# Patient Record
Sex: FEMALE | Race: WHITE
[De-identification: names, ages, dates, MRNs, and addresses within clinical notes are randomized per-mention and may not be internally consistent; named-entity substitution may affect disease eponyms.]

---

## 2021-01-25 ENCOUNTER — HOSPITAL ENCOUNTER (INPATIENT)
Dept: HOSPITAL 46 - MS | Age: 56
LOS: 7 days | Discharge: HOME | DRG: 734 | End: 2021-02-01
Attending: SURGERY | Admitting: SURGERY
Payer: COMMERCIAL

## 2021-01-25 VITALS — HEIGHT: 67 IN | BODY MASS INDEX: 29 KG/M2 | WEIGHT: 184.75 LBS

## 2021-01-25 DIAGNOSIS — C56.2: Primary | ICD-10-CM

## 2021-01-25 DIAGNOSIS — G89.18: ICD-10-CM

## 2021-01-25 DIAGNOSIS — R18.0: ICD-10-CM

## 2021-01-25 DIAGNOSIS — Z88.8: ICD-10-CM

## 2021-01-25 DIAGNOSIS — Z20.822: ICD-10-CM

## 2021-01-25 DIAGNOSIS — N93.8: ICD-10-CM

## 2021-01-25 DIAGNOSIS — Z87.891: ICD-10-CM

## 2021-01-25 PROCEDURE — C9803 HOPD COVID-19 SPEC COLLECT: HCPCS

## 2021-01-25 PROCEDURE — 0UT70ZZ RESECTION OF BILATERAL FALLOPIAN TUBES, OPEN APPROACH: ICD-10-PCS | Performed by: SURGERY

## 2021-01-25 PROCEDURE — 0DBU0ZZ EXCISION OF OMENTUM, OPEN APPROACH: ICD-10-PCS | Performed by: SURGERY

## 2021-01-25 PROCEDURE — A9270 NON-COVERED ITEM OR SERVICE: HCPCS

## 2021-01-25 PROCEDURE — U0003 INFECTIOUS AGENT DETECTION BY NUCLEIC ACID (DNA OR RNA); SEVERE ACUTE RESPIRATORY SYNDROME CORONAVIRUS 2 (SARS-COV-2) (CORONAVIRUS DISEASE [COVID-19]), AMPLIFIED PROBE TECHNIQUE, MAKING USE OF HIGH THROUGHPUT TECHNOLOGIES AS DESCRIBED BY CMS-2020-01-R: HCPCS

## 2021-01-25 PROCEDURE — 0DTJ0ZZ RESECTION OF APPENDIX, OPEN APPROACH: ICD-10-PCS | Performed by: SURGERY

## 2021-01-25 PROCEDURE — 0BBT0ZX EXCISION OF DIAPHRAGM, OPEN APPROACH, DIAGNOSTIC: ICD-10-PCS | Performed by: SURGERY

## 2021-01-25 PROCEDURE — 0UT90ZZ RESECTION OF UTERUS, OPEN APPROACH: ICD-10-PCS | Performed by: SURGERY

## 2021-01-25 PROCEDURE — 0UT20ZZ RESECTION OF BILATERAL OVARIES, OPEN APPROACH: ICD-10-PCS | Performed by: SURGERY

## 2021-01-25 PROCEDURE — 07TC0ZZ RESECTION OF PELVIS LYMPHATIC, OPEN APPROACH: ICD-10-PCS | Performed by: SURGERY

## 2021-01-25 PROCEDURE — 0UBF0ZX EXCISION OF CUL-DE-SAC, OPEN APPROACH, DIAGNOSTIC: ICD-10-PCS | Performed by: SURGERY

## 2021-01-25 NOTE — DS
[facility]
                                    [address], [place]  [address]
_________________________________________________________________________________________
                                                                 Draft    
 
 
ADMISSION DATE:  01/25/2021
 
DISCHARGE DATE:  02/01/2021
 
REASON FOR ADMISSION:
This 55-year-old white woman __________ transferred from [facility] with
Dr. Nina Chung with a large pelvic mass and ascites. 
 
The patient presented to the emergency room in [place] with abdominal pain and fullness
and a CT scan was performed by Dr. Chung confirming a multiloculated pelvic mass
suggestive of right ovary with ascites.  She has no prior history of ovarian problem,
specifically no ovarian torsion or ovarian cyst.  She has no family history of ovarian
cancer or uterine cancer. 
 
The patient does have dysfunctional menstrual bleeding for quite some time.  She has a
distant history of tubal ligation as well.  She was admitted for further evaluation and
care. 
 
PERTINENT PHYSICAL EXAMINATION:
GENERAL:  Showed a pleasant white woman, who did not look systemically toxic. 
HEENT:  Mucous membranes are reasonably moist.  Trachea midline. 
CHEST:  Clear. 
HEART:  Regular without murmur. 
ABDOMEN:  Distended and firm, consistent with ascites.  She did not have focal
tenderness or diffuse peritonitis, but was mildly tender throughout. 
 
LABORATORY STUDIES:
Showed a white count of 12.4, hematocrit 47, platelets 340,000.  Chem profile normal and
creatinine 0.9. 
 
HOSPITAL COURSE:
She was admitted with a strong suspicion of ovarian carcinoma, though other etiologies
including ovarian torsion or other problem were considered of course.  A CA-125 serum
tumor marker was obtained, which was elevated at 74.9.  Her diffuse abdominal tenderness
and bloating were significant and did improve with IV fluid administration likely
related to fluid losses and ultimately, she did undergo paracentesis for symptom control
and assessment of the peritoneal fluid. 
 
Subsequent evaluation showed no sign of malignant cells within the peritoneal fluid. 
 
Given her large mass of the pelvis, which is essentially consumed all of the pelvis and
her ascites, which at the time was of uncertain malignancy.  Recommendation was made for
 
                                                                                    
_________________________________________________________________________________________
PATIENT NAME:     ANALIA AMIN                   
MEDICAL RECORD #: [id number]            DISCHARGE SUMMARY             
          ACCT #: [id number]  
DATE OF BIRTH:   [birth year]            REPORT #: [id number]      
PHYSICIAN:        ALISON JEFFRIES MD                 
PCP:              NINA CHUNG MD           
REPORT IS CONFIDENTIAL AND NOT TO BE RELEASED WITHOUT AUTHORIZATION
 
 
                                  [facility]
                                    [address], [place]  [address]
_________________________________________________________________________________________
                                                                 Draft    
 
 
excision of the mass, probable hysterectomy, omentectomy, retroperitoneal lymph node
dissection, and other indicated procedures for a presumptive diagnosis of ovarian
carcinoma. 
 
On January 29, 2021, she underwent exploration of the abdomen, excision of what turned
out to be a left giant pelvic mass (ovarian mass) with total abdominal hysterectomy,
bilateral salpingo-oophorectomy, omentectomy, cul-de-sac biopsy, collection of
peritoneal fluid for cytology, brush biopsy of right hemidiaphragm and retroperitoneal
lymph node dissection, mostly in the aortocaval area and infracaval area centrally.
Assistant was Dr. Rodriguez. 
 
Frozen pathology at time of operation confirmed carcinoma of the ovary, possibly
mucinous type.  Appendectomy was performed as well given that finding. 
 
Postoperatively, she had a considerable amount of incisional pain despite a non-opiod
approach to her pain management including TAP blocks and other interventions.  On that
basis, she was given morphine PCA, which did control her pain quite well. 
 
She had progressive improvement, was begun on clear liquids on 1st postoperative day,
which she tolerated well.  Progressive intake of an advancement of her diet was noted
and by day of discharge, she is ambulating well, passing flatus without problem, has
incisional pain, well controlled with both opioid and non-opioid medications and is
doing well.  The paracentesis cytology showed no evidence of malignant cells, which was
somewhat surprising, but we await final pathology of other fluid obtained at time of
operation as well. 
 
FOLLOWUP PLAN:
She is return to see me in approximately 3-4 weeks.  She will likely be a candidate for
adjuvant chemotherapy depending on the pathologic findings ultimately.  She is advised
to lift no more than 20 pounds for the next 4 weeks.  She is encouraged to ambulate
daily.  She will leave [facility] on. 
 
DISCHARGE DIAGNOSES:
1. 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
ascites. 
2. Status post excision of giant left pelvic mass with total abdominal hysterectomy,
bilateral salpingo-oophorectomy, omentectomy, appendectomy, cul-de-sac biopsy,
collection of peritoneal fluid for cytology and brush biopsy of right hemidiaphragm and
retroperitoneal lymph node dissection. 
3. Distant history of tubal ligation.
4. Dysfunctional uterine bleeding.
 
 
                                                                                    
_________________________________________________________________________________________
PATIENT NAME:     ANALIA AMIN                   
MEDICAL RECORD #: [id number]            DISCHARGE SUMMARY             
          ACCT #: [id number]  
DATE OF BIRTH:   [birth year]            REPORT #: [id number]      
PHYSICIAN:        ALISON JEFFRIES MD                 
PCP:              NINA CHUNG MD           
REPORT IS CONFIDENTIAL AND NOT TO BE RELEASED WITHOUT AUTHORIZATION
 
 
                                  [facility]
                                    [address] [place] [address]  [address]
_________________________________________________________________________________________
                                                                 Draft    
 
 
 
 
            ________________________________________
            MD SHADE Desai/MODL
Job #:  [id number]
DD:  02/01/2021 09:32:48
DT:  02/01/2021 10:05:02
 
cc:            Nina Chung MD
 
 
Copies:  NINA CHUNG MD
~
 
 
 
 
 
 
 
 
 
 
 
 
 
 
 
 
 
 
 
 
 
 
 
 
 
 
 
                                                                                    
_________________________________________________________________________________________
PATIENT NAME:     ANALIA AMIN                   
MEDICAL RECORD #: [id number]            DISCHARGE SUMMARY             
          ACCT #: [id number]  
DATE OF BIRTH:   [birth year]            REPORT #: [id number]      
PHYSICIAN:        ALISON JEFFRIES MD                 
PCP:              NINA CHUNG MD           
REPORT IS CONFIDENTIAL AND NOT TO BE RELEASED WITHOUT AUTHORIZATION

## 2021-01-25 NOTE — NUR
EVENING ASSESSMENT COMPLETE. SCHEDULED MEDS ADMINISTERED. PRN ADMINISTERED FOR
5/10 ABD PAIN. PT REPORTS FEELING BLOATED. ABD FIRM AND DISTENDED. BOWEL TONES
ACTIVE. DENIES NAUSEA. FULL LIQUIDS PROVIDED. IVF INFUSING PER ORDER. WARM
BLANKET PROVIDED. PT DENIES QUESTIONS OR CONCERNS. CALL LIGHT IN REACH.

## 2021-01-25 NOTE — NUR
PT AMBULATES TO MED SURG. ORIENTED TO ROOM CALL LIGHT IN HAND. BOYFRIEND
PRESENT IN THE ROOM. PT VERBALIZES UNDERSTANDING NPO.

## 2021-01-25 NOTE — NUR
DR JEFFRIES IN TO SEE PT DISCUSSES DIAGNOSTIC PLAN GOING FORWARD ALL QUESTIONS
ANSWERED,  PRESENT IN THE ROOM. PT OKAY'D FOR CLEAR LIQUIDS, BROTH AND
JELLO PROVIDED. PAIN 4/10 WAITING FOR MD ORDERS

## 2021-01-25 NOTE — NUR
REPORT RECEIVED FROM DAY SHIFT RN. PT LYING IN BED ALERT AND ORIENTED EATING
JELLO. IVF INFUSING. PT DENIES NEEDS AT THIS TIME. WHITE BOARD UPDATED. CALL
LIGHT IN REACH.

## 2021-01-26 PROCEDURE — 0W9G3ZZ DRAINAGE OF PERITONEAL CAVITY, PERCUTANEOUS APPROACH: ICD-10-PCS | Performed by: SURGERY

## 2021-01-26 NOTE — NUR
BEDSIDE REPORT RECEIVED FROM RADHA ALEMAN. pt RESTING IN BED. DENIES ANY PAIN. NO
REQUESTS AT THIS TIME. IV NOTED TO BE LEAKING, FLUIDS DISCONNECTED. CALL LIGHT
IN REACH.

## 2021-01-26 NOTE — NUR
[facility] NURSING STUDENT JESSIE
IN PTS ROOM TO CHECK ON PT. PT STATES THAT HER PAIN IS
TOLERABLE AT THIS TIME. JESSIE ALSO DICUSSED WITH PT THAT  WILL BE IN
AFTER A BIT.

## 2021-01-26 NOTE — NUR
PT RESTING IN BED WITH . PT SAID SHE FEELS MUCH BETTER, REQUESTED TO
HAVE  VISIT TODAY. WILL INFORM FR GOYAL. GAVE BLESSING, WILL FOLLOW

## 2021-01-26 NOTE — NUR
this rn in pts room with [facility] nursing student mo. pt states that she is
feeling bloated in her abdomen at this time. pt states that her pain is well
managed at this time. this rn educated pt on the paracentesis produre, pt
states understanding and all questions answered at this time.

## 2021-01-26 NOTE — NUR
this rn in pts room with  to assist with pt having a paracentesis. pt
tolerated well.  able to get 2.5l off of pts abdomen.

## 2021-01-26 NOTE — NUR
Pt. got up and took a shower independantly. CNA wrapped IV site. Bed linens
changed, room picked up. no other needs at this time. call light with in
reach.

## 2021-01-26 NOTE — NUR
VS AND I&O COMPLETE. PT UP TO BR WITH MINIMAL ASSIST TO VOID 700 ML
CONCENTRATED URINE. GAIT STEADY. BACK TO BED, JACK WELL. REPORTS PAIN IS
TOLERABLE AND DENIES PRN FOR PAIN AT THIS TIME. PUDDING PROVIDED PER REQUEST.
 IN ROOM. NO FURTHER NEEDS.

## 2021-01-26 NOTE — NUR
PATIENT PROVIDED WITH BROTH AND PUDDING. VITALS AND I&O COMPLETED. GUEST IN
ROOM AND WAS ASKED IF HE NEEDED ANYTHING. GUEST DENIES ANY NEEDS. PATIENT WAS
ASKED IF SHE WANTED TO GET GET UP TO USE THE RESTROOM. PATIENT DENIED NEEDING
TO USE THE RESTROOM TO VOID. RADHA ALFARO IN ROOM.

## 2021-01-26 NOTE — NUR
Pt and spouse resting in bed.  State they live in [place] in a 2 story
home and only use lower floor.  Multiple steps down to get to house
but have new steps with good hand rails.  They have 5 adult children
grandchildren.  She works as a caregiver for 2 patients.  She is worried
about her diagnosis, but spouse is very supportive.  He is retired and
will stay home with pt, complete house hold chores, shop, and transport
as needed if pt has surgery.  Ultimate plan is for pt to dc to home when
she can be discharged.

## 2021-01-26 NOTE — NUR
VS AND I&O COMPLETE. PT DENIES NAUSEA. PRN ADMINISTERED FOR C/O BACK AND ABD
PAIN. BROTH AND PUDDING PROVIDED. ASSESSMENT COMPLETE. ABD REMAINS DISTENDED
AND FIRM. BOWEL TONES ACTIVE. SPOUSE IN ROOM. NO FURTHER NEEDS AT THIS TIME.
CALL LIGHT IN REACH.

## 2021-01-26 NOTE — NUR
this rn received report from babs ferraro. pt sitting up in bed with pts 
next to her. pts  a bit tearful this am. pt states that her pain level
is good and is planning to go for a walk in a bit

## 2021-01-26 NOTE — NUR
NEW IV STARTED LEFT FOREARM WNL, pt TOLERATED WELL. UP TO RESTROOM
INDEPENDENTLY FOR VOID AND BACK TO BED. IVF INFUSING WNL AS ORDERED. pt DENIES
PAIN AT THIS TIME. ABD DISTENDED, SOFT, BOWEL TONES ACTIVE X 4. DENIES PAIN
WITH PALPATION, "IT'S NOT LIKE BEFORE". VSS. CALL LIGHT AND PERSONAL SUPPLIES
IN REACH. ICE WATER REFILLED. pt DISCUSSING CA DIAGNOSIS, PLAN WITH THIS RN,
POSITIVE OUTLOOK. pt STATES SHE WOULD LIKE TO VISIT WITH  TOMORROW,
CHARGE RN NOTIFIED.

## 2021-01-27 NOTE — NUR
pt AMBULATING IN HALLWAY INDEPENDENTLY. IVF INFUSING WNL AS ORDERED. pt DENIES
PAIN. JELLO PROVIDED AS REQUESTED. NO ADDITIONAL NEEDS.

## 2021-01-27 NOTE — NUR
pt AWAKE WHEN RN ENTERS ROOM. RESTING IN BED WITH . pt RATES PAIN 6/10
IN ABDOMEN AND BACK, PRN MEDICATION ADMINISTERED. BOWEL TONES ACTIVE X 4, ABD
SOFT, DISTENDED, TENDER WITH PALPATION. VSS. URINE HAT EMPTIED. JELLO,
PUDDING, CRACKERS PROVIDED. IVF INFUSING WNL AS ORDERED. CALL LIGHT IN REACH.

## 2021-01-27 NOTE — NUR
pt AMBULATING HALLWAY MULTIPLE TIMES THIS SHIFT INDEPENENDENLY. NO COMPLAINTS
OF PAIN. ABD DISTENDED, SOFT, BOWEL TONES ACTIVE, NON TENDER WITH PALPATION.
IVF INFUSING WNL THROUGHOUT SHIFT.

## 2021-01-27 NOTE — NUR
CALL LIGHT ANSWERED. IV PUMP ALARMING, DISTAL AIR. IVF NOW INFUSING WNL AS
ORDERED. pt RESTING IN BED ON SIDE.  ALSO IN BED. pt APPEARS RELAXED,
BREATHING UNLABORED. LIGHTS OFF IN ROOM.

## 2021-01-27 NOTE — NUR
THIS RN IN PTS ROOM WITH  TO DISCUSS PTS TREATMENT PLAN. PT STATES
UNDERSTANDING OF TREATMENT PLAN AND IS MOTIVATED FOR SURGERY. PT STATES THAT
HER PAIN IS WELL CONTROLLED AND THAT SHE PLANS TO GO FOR ANOTHER WALK WITH HER
DAUGHTER THIS AFTERNOON.
PTS DAUGHTER ASKING IF THERE WAS A CERTAIN NUTRITION FOR PRIOR TO CHEMO IF
THAT'S THE ROUTE SHE IS GOING TO GO. THIS RN WILL HAVE JAI FROM DIETARY
FOLLOW UP WITH PT

## 2021-01-27 NOTE — NUR
BROUGHT PUDDING TO PT'S ROOM. SHE IS FINISHED SHOWERING. UNWRAPPED IV AND IV
FLUID IS NOW INFUSING. PT DENIES FURTHER NEEDS. CALL LIGHT IS CLOSE.

## 2021-01-27 NOTE — NUR
Spoke with Dr. Song.  Pt does not want to go out of town for surgery and he
has agree to complete surgery on Friday.  Pt is very happy with descision.

## 2021-01-27 NOTE — NUR
THIS RN IN PTS ROOM TO GIVE PT HER MORNING MEDS AND DO MORNING ASSESSMENT. PT
STATES THAT SHE IS HAVING SOME INCREASED ABDOMINAL PAIN. THIS RN OFFERED PT IV
TYLENOL, PT STATES THAT SHE ISN'T READY TO TAKE A MED YET. THIS RN OFFERED PT
A HEAT PACK FOR THE PAIN. PT STATED THAT THIS HELPED HER PAIN AND SHE WAS NOT
INTERESTED IN ANYMORE PAIN MEDS.

## 2021-01-27 NOTE — NUR
PATIENT DENIES PAIN OR NAUSEA. PATIENT SITTING IN BED VISITING WITH .
CALLED SUPERVISOR TO GET SOME PUDDING FOR PATIENT. SHOWER SET UP AND LEFT
WRIST WRAPPED FOR PATIENT TO TAKE SHOWER. CALL LIGHT IN REACH.

## 2021-01-27 NOTE — NUR
PT ALERT, ORIENTED AND RESTING IN BED WITH HER . PT IS SNACKING ON
CHIPS, SEEMED COMFORTABLE. PT MENTIONED AT SHE IS DOING BETTER. PT WOULD LIKE
TO HAVE THE  IN TODAY. INFORMED FR GOYAL. GAVE BLESSING, WILL FOLLOW

## 2021-01-27 NOTE — NUR
this rn received report from stas ferraro. pt awake and sitting up in bed. pt
states that her pain is well managed this am. pt states that needs nothing at
this time and states that no new questions.

## 2021-01-27 NOTE — NUR
PATIENT AWAKE IN BED,  IN ROOM, BOTH EATING DINNER. VITSL AND
I&OS CHARTED. CALL LIGHT AND PERSONAL ITEMS WITHIN REACH

## 2021-01-27 NOTE — NUR
ASH LET ME KNOW PATIENT WAS WONDERING ABOUT NUTRITION FOR CANCER TREATMENT.
PATIENT MAY END UP NEEDING CHEMO IN THE FUTURE FOR OVARIAN CANCER. HER MAIN
QUESTION IS WHAT SHOULD SHE BE EATING IF SHE DOES END UP ON CHEMO. I EXPLAINED
THAT CONSUMING ENOUGH PROTEIN IS REALLY IMPORTANT AND TO EAT PROTEIN
THROUGHOUT THE DAY INSTEAD OF ONE LARGE AMOUNT ONCE A DAY. SHE LIKES BEEF,
EGGS, MILK, COTTAGE CHEESE, AND BEANS. ALSO EXPLAINED TO EAT MORE PLANT-BASED
FOODS SUCH AS GETTING AT LEAST 5 SERVINGS OF A FRUITS/VEGGIES IN EACH DAY AND
WHOLE GRAINS FOR MORE NUTRITIONAL VALUE AND FIBER. SHE PREFERS TO AVOID
ARTIFICIAL SWEETENERS SO IF SHE DECIDES TO BUY A PROTEIN POWDER SHE WILL NEED
TO LOOK AT THE INGREDIENTS SINCE A LOT OF PROTEIN POWDERS IN STORES LOCALLY
HAVE ARTIFICIAL SWEETENERS IN THEM. SHE MOST LIKELY WILL NEED TO BUY A PROTEIN
POWDER ONLINE. SHE ASKED IF THERE ARE SUPPLEMENTS SHE SHOULD BE TAKING BUT I
COULDN'T SPEAK TO THAT SINCE I DON'T KNOW HER WHOLE SITUATION AND PLAN GOING
FORWARD. I PROVIDED HER A HANDOUT ON NUTRITION DURING AND AFTER CANCER
TREATMENT AND ANOTHER ON ADDING PROTEIN TO FOODS. NO OTHER QUESTIONS AT THIS
TIME. WILL REMAIN AVAILABLE IF NEEDED.

## 2021-01-28 NOTE — NUR
PATIENT RESTING IN BED EYES CLOSED, RESPIRATIONS REGULAR AND EVEN, PATIENT'S
 LAYING IN BED WITH HER CALL LIGHT IN REACH.

## 2021-01-28 NOTE — NUR
0700: Report received from Boris GARCIA. Pt resting in her bed with no complaints
or needs at this time, call bell within reach.

## 2021-01-28 NOTE — NUR
PT RESTING IN HER BED AND SHE STATES SHE HAS ABD PAIN AT A 4 WHICH IS
ACCEPTABLE AND SHE DENIES ANY NAUSEA OR OTHER PROBLEM. PT STATES HER PAIN
IS MUCH BETTER THAT IT WAS WHEN SHE CAME HERE. ASSESSMENT COMPLETED.

## 2021-01-28 NOTE — NUR
PT IS DONE TAKING HER SHOWER. NEW BAG OF LR IS NOW INFUSING. PT REPORTS HAVING
A BAD DAY AND STATES SHE IS STRESSED. PT ASKED FOR AN ADVANCED DIRECTIVES
PACKET THAT SHE WILL COMPLETE ALONG WITH ONE FOR HER . CLEANED UP ROOM
A BIT AND PT DENIES FURTHER NEEDS.

## 2021-01-28 NOTE — NUR
Pt returned from her walk and she is heard yelling and cussing in her room
with the door closed and her spouse in the room. The charge nurse was notified
and security was called. Security is outside the room in the [place] at this
time.

## 2021-01-28 NOTE — NUR
PT ALERT, ORIENTED AND JUST FINISHING UP PHONE CALL. PT STSTED SHE WILL HAVE
SURGERY FRI, SEEMS AT [place] WITH THIS DECISION. PT MENTIONED THAT HER DAUGHTER
IS COMING IN AND THIS SEEMED TO CHEER HER. PT REQUESTED A VISIT FROM THE
 TODAY. INFORMED FR SEGOVIA. GAVE BLESSING AND HAD PRAYER WITH PT. WILL
FOLLOW

## 2021-01-28 NOTE — NUR
PT STATES HER ABD PAIN IS A 1/10 AND THIS IS ACCEPTABLE TO HER. SHE WALKED
ABOUT 6 LAPS IN THE HALLS WITH HER DAUGHTER AND IS NOW RESTING IN HER BED. SHE
DENIES ANY NEW PROBLEMS. SEE ASSESSMENT.

## 2021-01-28 NOTE — NUR
RT COLLECTED COVID 19 SWAB WITH NO COMPLICATIONS. RT USED THE CEPHEID
RAPID TEST THROUGH [facility] PER DR REQUEST AT THIS TIME.

## 2021-01-28 NOTE — NUR
ROUNDED ON PATIENT, HER AND SPOUSE BOTH SLEEPING ON COUCH. NO APPARENT NEEDS
AT THIS TIME. IVF INFUSING. CALL LIGHT IN REACH.

## 2021-01-28 NOTE — NUR
REPORT RECEIVED FROM BEAU GARCIA. PT HAS NO NEEDS AT THIS TIME, SPOUSE AT
BEDSIDE. WILL CONTINUE TO MONITOR

## 2021-01-28 NOTE — NUR
IN RM TO GET PT VITALS, CHARGE RN IN RM FOR IV, PT IS BACK FROM THE SHOWER,
PUDDING PROVIDED, NO FURTHER NEEDS AT THIS TIME

## 2021-01-28 NOTE — NUR
PATIENT HAS SLEPT OFF AND ON, PATENT HAS WALKED MULTIPLE LAPS ON 2 DIFFERENT
OCCASIONS ON THIS SHIFT AROUND THE UNIT. PATIENT'S  HAS REMAINED IN THE
ROOM WITH HER. PATIENT DRINKING AND EATING WELL. PATIENT TOOK A SHOWER LAST
NIGHT. PATIENT HAS HAD NO PAIN AND VERY FEW NEEDS. PATIENT HAS REMAINED
INDEPENDENT IN THE ROOM. CALL LIGHT IS IN REACH.

## 2021-01-28 NOTE — NUR
Jesusita was talked to about the visitor policy and she raised her voice and states
"I can't be alone". She states she will walk to the front door if she has too
and she will not sit in her room by herself. Her spouse states he will be here
and that she is not leaving. The pt continues to have a raised voice and she
states, "I'm pissed you left me here by myself", she was speaking to her
spouse. Pt remains "Pissed" and she states a walk may help her and she got up
and is walking in the halls at this time.

## 2021-01-28 NOTE — NUR
SCHEDULED MEDICATIONS ADMINISTERED, PT REQUESTS TO TAKE SHOWER, IV WRAPPED.
SPOUSE IN ROOM. MADE PLAN WITH PT FOR EVENING ROUTINE. CALL LIGHT IN REACH, NO
OTHER NEEDS AT THIS TIME.

## 2021-01-29 PROCEDURE — 3E0T33Z INTRODUCTION OF ANTI-INFLAMMATORY INTO PERIPHERAL NERVES AND PLEXI, PERCUTANEOUS APPROACH: ICD-10-PCS | Performed by: NURSE ANESTHETIST, CERTIFIED REGISTERED

## 2021-01-29 PROCEDURE — 3E0T3BZ INTRODUCTION OF ANESTHETIC AGENT INTO PERIPHERAL NERVES AND PLEXI, PERCUTANEOUS APPROACH: ICD-10-PCS | Performed by: NURSE ANESTHETIST, CERTIFIED REGISTERED

## 2021-01-29 NOTE — NUR
Patient arrives from PACU in bed with RADHA Hernandez. Patient states multiple times
pain in abdomen. Unable to rate, states it hurts a lot. IV fluids changed to
LR at 85 mL/hr by pump, Toradol given as prescribed. Assessment completed.
Spouse at bedside.

## 2021-01-29 NOTE — NUR
Continues to complain of pain. Analgesic administered. On room air at this
time. Vitals obtained. Tolerating clear liquids. Dressing remains clean, dry
and intact.

## 2021-01-29 NOTE — NUR
VITALS AND I/O'S COMPLETE, LR ON STRAIGHT TUBING IN ROOM. PRE PROC CHECKLIST
INITIATED. PT RESTING ON COUCH WITH SPOUSE, IVF INFUSING WNL. NO NEEDS AT THIS
TIME. CALL LIGHT IN REACH

## 2021-01-29 NOTE — NUR
PCA PUMP SET UP. PT EDUCATED ALONG WITH . VERIFIED WITH CHARGE RN. CALL
LIGHT IN REACH, WILL CONTINUE TO MONITOR.

## 2021-01-29 NOTE — NUR
ROUNDED ON PATIENT, ON COUCH WITH SPOUSE, RESTING W EYES CLOSED. IVF INFUSING
WNL. CALL LIGHT IN REACH, NO APPARENT NEEDS AT THIS TIME.

## 2021-01-29 NOTE — NUR
CONTINUES TO STATE, "IT HURTS SO BAD." MULTIPLE TIMES WHILE STAFF IN ROOM.
CONTINUES RATING PAIN 10/10. YELLING OUT "OW" AS WELL, CAN BE HEARD AT NURSE'S
STATION. DR. JEFFRIES NOTIFIED. TELEPHONE ORDERS READ BACK DR. JEFFRIES/FAUSTO ALEJANDRORN
FOR MORPHINE 2MG IV X1 NOW. PATIENT NOTIFIED OF ORDER.

## 2021-01-29 NOTE — NUR
PATIENT LETHARGIC BUT EASY TO AWAKE. REPORTS PAIN LEVEL 9/10 AFTER TORADOL
GIVEN AN HOUR AGO. WATER AND ICE CHIPS GIVEN. CLEARS DINNER TRAY ORDERED.
DRESSING INTACT. SPOUSE AT THE BEDSIDE. CALL LIGHT WITHIN REACH.
ICE PACKS OFFERED, PATIENT REFUSED.
REMAINS LETHARGIC AND ON 2L O2. WILL CONTINUE TO MONITOR.

## 2021-01-29 NOTE — NUR
Lying in bed, on side. Eyes closed. Respirations even and unlabored. Call
light in reach. Bed rails up X2.

## 2021-01-29 NOTE — NUR
PT STATING 10/10 PAIN, MOANING AND YELLING OUT TO NURSES STATION FREQUENTLY.
DR JEFFRIES PHONED, NEW ORDER FOR MORPHINE PCA RECEIVED, READ BACK TO CLARIFY.

## 2021-01-29 NOTE — NUR
IN TO [facility], CHARGE RN IN  AS WELL, PICKED UP 'S DINNER TRAY,
PT WASN'T DONE WITH HER TRAY, NO FURTHER NEEDS AT THIS TIME

## 2021-01-29 NOTE — NUR
States pain is improving. "It's not good, but it's better." Denies other needs
at this time. Continues to tolerate clear liquids at this time.

## 2021-01-29 NOTE — NUR
SCHEDULED MEDICATIONS ADMINISTERED. PT CONTINUES TO C/O PAIN, REPOSITIONED,
WARM BLANKETS PROVIDED, AWAITING VERIFICATION OF PCA ORDER.  AT
BEDSIDE, ENGAGED IN CARE. SHAH CARE COMPLETE, WNL. PT ON RA, CPOX IN PLACE,
AT 96%. SCDS ON. CALL LIGHT IN REACH.

## 2021-01-29 NOTE — NUR
ROUNDED ON PATIENT. IN BED WITH EYES CLOSED, BREATHING REGULAR. OPENS EYES TO
VOICE, STATES PAIN IS "BETTER".  AT BEDSIDE. STATES NO NEEDS. WILL
CONTINUE TO MONITOR.

## 2021-01-29 NOTE — NUR
PTS  TO RN STATION, STATES THAT PT IS HAVING UNCONTROLLED PAIN. PT CAN
BE HEARDING MOANING AUDIBLY FROM RN STATION. PT THEN UTILIZES CALL LIGHT, IS
GRIMACING AND CRYING IN PAIN. RATES PAIN 10/10. PRIMARY RN NOTIFIED. PRN TO BE
ADMINISTERED. CALL LIGHT IN REACH.

## 2021-01-29 NOTE — NUR
Assessment completed. Educated for need to complete surgical wipe down with
instructions, verbal and written given. New, clean gown provided as well.
Verbalizes understanding. AM medcations given as scheduled. Denies pain this
AM. Spouse at bedside. Call light in reach. Bed rails up X2. Denies other
needs.

## 2021-01-29 NOTE — NUR
01/29/21 Peyton9 Mary Liu
1438 PATIENT ARRIVES TO PACU UNRESPONSIVE TO PAIN. OCCASIONAL JAW
THRUST FOR AIRWAY SUPPORT. MASK AT 6 LITERS.
1445 PATIENT CONTINUES TO BE UNRESPONSIVE TO PAIN. RESP EVEN AND
UNLABORED, MASK CONTINUES AT 6 LITERS.

## 2021-01-29 NOTE — NUR
ROUNDED ON PATIENT, RESTING ON THE COUCH WITH SPOUSE. IVF INFUSING WNL. NO
NEEDS AT THIS TIME. WILL CONT TO MONITOR.

## 2021-01-30 NOTE — NUR
Pt had reported severe 10/10 pain at start of shift, MD called, orders for PCA
pump received. Pt now reports 2/10 tolerable discomfort, resting in bed.
Midline incision covered with Actecote, C/D/I. ABD tender, bowel tones active.
CPOX in place, 90-96% on RA. IVF infusing WNL. SCDS on. Tolerating clear
liquid diet.  in room. Calls appropriately.

## 2021-01-30 NOTE — NUR
Patient in bed with spouse at the bedside and is beginning to work on dinner.
Patient denies needs at this time. Patient denies needs to use the bathroom
but has agreed to try voiding after eating dinner. Call light within reach.
Bedrails up x2.
Will continue to monitor.

## 2021-01-30 NOTE — NUR
IN TO GET PT VITALS WITH RN, PABLO EMPTIED AT THIS TIME, TOOK PT's LEFTOVER
DINNER TRAY OUT, PT PROVIDED WITH A JELLO, NO FURTHER NEEDS AT THIS TIME

## 2021-01-30 NOTE — NUR
Patient ambulated to restroom and voided 150 without difficulty since removal
of FC. Currently in bed and working on finishing dinner. Dressing intact with
scant amount of drainage. Reports pain level 6/10 after activity. O2 sats
91% on room air. Call light within reach. PCA control within reach. Spouse
at the bedside. Bedrails up x2.

## 2021-01-30 NOTE — NUR
PCA SYRINGE CHANGED, PT REPORTS 4/10 PAIN CURRENTLY BUT STATES IT IS
TOLERABLE. SCHEDULED MEDS ADMINISTERED. CNA IN ROOM FOR VITALS. PT REPORTS NO
OTHER NEEDS AT THIS TIME.

## 2021-01-30 NOTE — NUR
Uses call light to voice concern about IV. IV site to left wrist assessed,
WNL. Old drainage under dressing noted. Call light in reach, bed rails up X2.

## 2021-01-30 NOTE — NUR
CALL LIGHT ANSWERED.  CALLS AS PULSE OX IS ALARMING. SPO2 NOTED 86-87%
ON RA, pt DROWSY, AWAKENS TO VOICE. 2L OXYGEN BY NC APPLIED. pt USING MORPHINE
PCA AT THIS TIME. SPO2 INCREASES TO 98% ON 2L OXYGEN. CALL
LIGHT IN REACH. NO ADDITIONAL REQUESTS.

## 2021-01-30 NOTE — NUR
Recieved report from night shift RN. Patient resting in bed with no complaints
of pain at this time. Spouse at the bedside and the call light is
within reach.
Patient agreed
to walk this morning before 0900. Will assess further progress at that
time.

## 2021-01-30 NOTE — NUR
Patient ambulates in [place] with standby assist. Returns to room. This nurse
present as student nurse DC's canela catheter.

## 2021-01-30 NOTE — NUR
Call light answered, IV pump alarming, new bag IVF hung. Assessment complete.
Pt states pain 2/10, midline visualized, dressing C/D/I. Abdomen tender but pt
states tolerable. CPOX in place, reading at 96% on RA. Pt demonstrating
competency using PCA. Call light in reach

## 2021-01-30 NOTE — NUR
Ambulated in hallway 3 times today with standby assist, walking further each
time. IV fluids continue infusing, remains on Morphine via PCA with total in
of 20 mg during the shift. Pain controlled. Tolerating full liquid diet. Mojica
catheter DC'd, no UO since removal at approximately 1630. On room air.
Dressing over midline abdominal incision remains clean, dry and intact. SCD's
when in bed.

## 2021-01-30 NOTE — NUR
CALL LIGHT ANSWERED. SBA TO RESTROOM FOR VOID, GAIT STEADY, MINIMAL ASSIST OUT
OF BED FROM . IVF INFUSING WNL. PRIMARY RN IN ROOM TO ASSESS PAIN. pt
REQUESTING TO WALK IN HALLWAY FIRST. AMBULATING WITH  SBA AT THIS TIME.

## 2021-01-30 NOTE — NUR
CALL LIGHT ANSWERED, PT STATES PCA NOT WORKING, MAX DOSE REACHED. PT EDUCATED
REGARDING LOCKOUT DOSAGE AND TIMING OF MEDS. PT REPORTS 4/10 PAIN CURRENTLY
BUT STATES "ITS NOTHING LIKE IT WAS". STATES NO OTHER NEEDS AT THIS TIME, CALL
LIGHT IN REACH.

## 2021-01-30 NOTE — NUR
Patient attempted amublating at 0850. Upon sitting at the edge of the bed
became nauseated and lightheaded. Patient reported dizziness at this time.
Zofran administered per PRN order for nausea. Patient was able to walk into
the hallway but could only tolerate a few steps before turning around and
needing to rest.
Patient desat to 85% O2 on room air. 1L O2 administered, patient sats only
increased to 88%. Currently maintaining O2 saturation between 93%-95% on 2L
O2.
Assessment completed. Patient reports pain level on 3/10. Pain management goal
currently achieved. Dressing intact with scant amount of drainage. Patient
able to tolerate slight abdominal palpation.
Patient resting quietly in bed with clears breakfast tray. Call light within
reach. PCA control within reach. Denies any further needs at this time.

## 2021-01-30 NOTE — NUR
Patient currently sitting up in bed eating the rest of lunch.
Assessment completed. Dressing intact with scant drainage. Tolerates gentle
abdominal palpation. Bowel tones active.
Patient reports pain goal of a 3 and states that current pain level is 3/10.
Patient expresses sophie that desired goal for pain is being achieved and is
beginning to use the PCA less often.
Patient agreees to go on another walk with staff this afternoon.
Call light within reach. Bedrails up x2. PCA control within reach.
Patient denies further needs at this time.

## 2021-01-30 NOTE — NUR
Patient resting in bed quietly with spouse at the bedside. Reports "feeling
tired and just wanting to sleep". Reports no other needs at this time. Call
light within reach. Bedrails up x2. PCA control within reach.
Will continue to monitor.

## 2021-01-30 NOTE — NUR
Sitting up in bed for lunch. Spouse at bedside. Denies other needs. Call light
in reach. Bed rails up X2.

## 2021-01-30 NOTE — NUR
Report from RADHA Sal. Patient awake in bed. Denies needs at this time.
States she is very tired and feels weak. Informed goal is to get up ASAP and
walk this AM. Verbalizes understanding. Call light in reach. Bed rails up X2,
spouse at bedside.

## 2021-01-30 NOTE — NUR
Patient ambulated standby assist in halls and completed one full lap around
the unit. Reports pain increased to 5/10 with actvity. PCA control left within
reach. Maintaining O2 saturation of 96% on room air.
Mojica catheter removed and was intact.
Diet advanced from clears to full liquid with hopes
of transitioning to PO pain medications.
Dinner tray ordered, patient back to bed with call light within reach. Bed
rails up x2.
Patient denies further needs at this time.

## 2021-01-30 NOTE — OR
[facility]
                                    [address], [place]  [address]
_________________________________________________________________________________________
                                                                 Signed   
 
 
DATE OF OPERATION:
01/25/2021
 
SURGEON:
Alison Jeffries MD
 
PREOPERATIVE DIAGNOSIS:
Giant pelvic mass with ascites.
 
POSTOPERATIVE DIAGNOSIS:
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
 
PROCEDURE:
Exploration of abdomen, excision of left giant pelvic mass with total abdominal
hysterectomy with bilateral salpingo-oophorectomy, omentectomy, cul-de-sac biopsy,
collection of peritoneal fluid for cytology, brush biopsy of right hemidiaphragm,
retroperitoneal lymph node dissection, and appendectomy. 
 
ASSISTANT:
Ward Rodriguez DO
 
ANESTHESIA:
General endotracheal; Haleigh Glover CRNA and postoperative TAP blocks.
 
INDICATION:
This 55-year-old white woman is a patient of Dr. Nina Chung. She presented to the
hospital in [place] with abdominal pain and bloating and distention.  She was admitted
to the hospital and evaluation by Dr. Chung included a CT scan of the abdomen which
showed a large complex pelvic mass, thought likely to be a right adnexal mass.
Considerable amount of ascites was noted as well.  She was referred and transferred for
further evaluation and care. 
 
Evaluation in [place] included obtaining a CA-125 level which was 74.9 as well as
paracentesis for relief of her symptoms and for the diagnosis.  Preliminary results of
the cytology did not show malignancy.  A CT scan did not show obvious peritoneal
studding, omental cake or other particular abnormalities other than the complex mass and
ascites. 
 
Careful consideration has been made to the etiology of the problem and most likely
represents ovarian cancer.  I have advised debulking of the tumor, staging laparotomy
and other indicated procedures. 
 
 
    Electronically Signed By: ALISON JEFFRIES MD  01/30/21 1611
_________________________________________________________________________________________
PATIENT NAME:     ANALIA AMIN                   
MEDICAL RECORD #: [id number]            OPERATIVE REPORT              
          ACCT #: [id number]  
DATE OF BIRTH:   [birth year]            REPORT #: [id number]      
PHYSICIAN:        ALISON JEFFRIES MD                 
PCP:              NINA CHUNG MD           
REPORT IS CONFIDENTIAL AND NOT TO BE RELEASED WITHOUT AUTHORIZATION
 
 
                                  [facility]
                                    [address], [place]  [address]
_________________________________________________________________________________________
                                                                 Signed   
 
 
The patient is premenopausal.  She has had dysfunctional uterine bleeding episodically
for quite some time including prolonged and protracted menstrual periods, which was
ongoing when she presented on this occasion as well. 
 
I have discussed with her the recommendation of excision of the mass and probable
hysterectomy, omentectomy, staging including lymph nodes, biopsies, and so on.  She
strongly prefers to remain local rather than proceed to GYN oncologist, though it has
been offered on more than one occasion. 
 
The risk of bleeding, infection, ureteral injury, failure to cure, need for additional
medication (chemotherapy), and so forth were reviewed in detail with the patient and her
. They understand and wished to proceed. 
 
FINDINGS:
She had less peritoneal fluid than prior to paracentesis no doubt.  There was no
evidence of carcinomatosis proper.  The pelvic mass itself was multilobulated but
generally smooth in texture and somewhat adherent to the deep pelvic peritoneum.  The
mass was excised without rupture.  There appeared to be somewhat granular texture of the
retrouterine pouch of Ambrosio, which was biopsied, though there was no gross disease
there.  Complete hysterectomy was undertaken.  Lymph nodes were dissected free between
the right common iliac and vena cava and more centrally in the infra-aortic bifurcation
area tending towards the left.  Dissection was not carried to the duodenum on the left
side, particularly.  There was no evidence of pathologic adenopathy anyway.  The omentum
was not grossly involved in neoplasm though an infracolic omentectomy was undertaken as
well as part of staging and brush biopsy of right hemidiaphragm uptake obtained.  The
liver appeared normal and the right hemidiaphragm had no suspicious nodules on its
surface either. 
 
Frozen pathology confirmed "carcinoma" and possibly a mucinous carcinoma.  On that
basis, appendectomy was additionally performed. 
 
DESCRIPTION OF PROCEDURE:
The patient was brought to the operating room, given a general endotracheal anesthetic.
Preoperative antibiotic cefoxitin was given.  Sequential compression device stockings
were used and heparin subcutaneously administered.  Bimanual pelvic exam showed no
palpable abnormality of the vagina or the cervix itself.  The large mass was ballotable.
 It extended about 4 fingerbreadths below the umbilicus itself.  The abdomen was
prepared with a chlorhexidine solution and draped sterilely.  A midline incision was
made extending above the umbilicus to the symphysis pubis.  Dissection carried through
the subcutaneous tissue and the midline fascia incised.  The abdomen was entered and
some turbid fluid was noted.  It was not bloody.  Some sample of that fluid was obtained
for cytology, though she has undergone a paracentesis already as noted. 
 
    Electronically Signed By: ALISON JEFFRIES MD  01/30/21 1614
_________________________________________________________________________________________
PATIENT NAME:     ANALIA AMIN                   
MEDICAL RECORD #: [id number]            OPERATIVE REPORT              
          ACCT #: [id number]  
DATE OF BIRTH:   [birth year]            REPORT #: [id number]      
PHYSICIAN:        ALISON JEFFRIES MD                 
PCP:              NINA CHUNG MD           
REPORT IS CONFIDENTIAL AND NOT TO BE RELEASED WITHOUT AUTHORIZATION
 
 
                                  [address]  [address]
_________________________________________________________________________________________
                                                                 Signed   
 
 
 
Palpation revealed the mass to be multilobulated, relatively smooth on its surface and
extending deep into the pelvis.  It occupied essentially all the pelvis.  Although it
was thought initially to be a right adnexal mass, in fact it improved to be in the
origin of the left adnexa (ovary).  Examination of the upper abdomen showed no sign of
carcinomatosis.  The small bowel was normal.  The omentum has minimal nodularity to it,
but certainly no omental caking proper.  The right hemidiaphragm was normal by palpation
and visualization.  The liver was normal. 
 
A Bookwalter retractor was affixed to the table.  The small bowel was packed superiorly
and also to the left with laparotomy pads. 
 
Manipulation of the mass was such that its origin was from the left adnexa.  The right
adnexa appeared to be normal including the fallopian tube and the ovary itself.  The
ovary was atretic.  There appeared to be possibly a plastic ligation device to it. 
 
The infundibulopelvic ligament in relation to the left ovary and the bulky mass was
isolated and doubly secured with 0 silk ties and divided.  Additional dissection of the
broad ligament was undertaken ultimately freeing the left ovary in continuity with the
bulky mass.  With various manipulations in the pelvis, it was gently freed and rocked
out of the abdomen and passed for frozen pathology. 
 
Evaluation of the pelvis showed uncertainty as to cul-de-sac metastatic disease, it was
not obvious particularly.  There was mild oozing in the area and a laparotomy pad was
packed into the pelvis. 
 
Straight Tommy clamps were applied to the horn of the uterus bilaterally.  Mindful
of the lesion most likely was malignant and given her underlying menometrorrhagia as
well, hysterectomy was deemed advisable. 
 
The round ligament was isolated on the right and subsequently the left and divided and
the broad ligament divided.  The right ureter was easily identified in the
retroperitoneum passing over the right iliac artery.  Bilateral symmetric dissection was
undertaken throughout.  Tommy clamps were applied to the lower uterine segment
applied out of harm's way of the ureters and secured with 0 silk ties sequentially.
Straight Tommy clamps were used to divide the paracervical tissue, staying close to
the cervix itself and securing the pedicles with 0 Vicryl suture.  Stay stitches were
applied to the lateral corners of the vagina.  Cardinal ligaments were divided and
palpation could easily discern the cervix.  The vaginal cuff was transected with a
margin of at least 2 cm showing no sign of pathologic finding of the cervix itself.
Figure-of-eight sutures were used to secure the vaginal cuff.  By this point, the
pathology report did return describing the large pelvic mass as "carcinoma."  Discussion
 
    Electronically Signed By: ALISON JEFFRIES MD  01/30/21 1611
_________________________________________________________________________________________
PATIENT NAME:     ANALIA AMIN                   
MEDICAL RECORD #: [id number]            OPERATIVE REPORT              
          ACCT #: [id number]  
DATE OF BIRTH:   [birth year]            REPORT #: [id number]      
PHYSICIAN:        ALISON JEFFRIES MD                 
PCP:              NINA CHUNG MD           
REPORT IS CONFIDENTIAL AND NOT TO BE RELEASED WITHOUT AUTHORIZATION
 
 
                                  [address]  [address]
_________________________________________________________________________________________
                                                                 Signed   
 
 
with the pathologist (Dr. Gross) indicated most likely mucinous carcinoma. 
 
The pelvis was packed with dry laparotomy pads and attention turned towards elsewhere. 
 
Given the probable mucinous nature of the lesion, appendectomy was deemed advisable.
The mesoappendix was isolated and secured with a hemostat, divided and secured with 3-0
silk tie.  The base of the appendix was milked distally and the crush janet ligated with
a 2-0 Vicryl suture.  The appendix was amputated.  The stump cauterized and inverted.  A
Z-stitch of 3-0 silk suture was used to invert the stump. 
 
Careful inspection of the retroperitoneum showed no particular adenopathy.  Still given
the nature of the pathology of ovarian carcinoma, retroperitoneal lymph node tissue was
obtained. 
 
The right colon was incised mobilizing the cecum superiorly and the peritoneal incision
taken transversely exposing well the vena cava, the aorta, the right common iliac and
the right ureter which was kept out of harm's way.  Dissection was taken between the
vena cava and the right iliac artery excising retroperitoneal lymph tissue.  Clips were
applied as necessary.  Reasonably generous specimen was excised.  Attention was then
turned towards the bifurcation of the aorta.  Lymph tissue noted in the central portion
between the iliacs was additionally dissected free, also using the clips as required.
This dissection tended towards the left iliac and artery and vein as well.  Palpation
more cephalad to the duodenum showed no pathologic abnormality and additional dissection
was not done. 
 
Some Mat was applied to the retroperitoneal tissue for its hemostatic benefit and the
peritoneum reapproximated with interrupted 2-0 Vicryl loosely. 
 
Omentectomy is then performed using sequential application of hemostats and securing the
vascular pedicles with 3-0 silk ties.  The infracolic omentum was excised and labeled as
"infracolic omentum." 
 
A biopsy brush was used to sample the right hemidiaphragm and placed in CytoLyt
solution.  Reinspection of the pelvic peritoneum and the cul-de-sac was undertaken.  A
segment of perineum was excised as biopsy for further examination.  Small amount of raw
surface in the low pelvis was noted, although Mat had been applied to it.  Additional
measures were deemed advisable and Tisseel was applied to those areas with good
hemostatic effect. 
 
The abdomen was irrigated with saline solution.  Reinspected particularly in the
retroperitoneum showing no sign of ongoing bleeding or other problem.  The small bowel
was returned to the abdomen in an anatomic configuration and plans made for closure.
 
    Electronically Signed By: ALISON JEFFRIES MD  01/30/21 3139
_________________________________________________________________________________________
PATIENT NAME:     ANALIA AMIN                   
MEDICAL RECORD #: [id number]            OPERATIVE REPORT              
          ACCT #: [id number]  
DATE OF BIRTH:   [birth year]            REPORT #: [id number]      
PHYSICIAN:        ALISON JEFFRIES MD                 
PCP:              NINA CHUNG MD           
REPORT IS CONFIDENTIAL AND NOT TO BE RELEASED WITHOUT AUTHORIZATION
 
 
                                  [facility]
                                    [address] [place] [address]  [address]
_________________________________________________________________________________________
                                                                 Signed   
 
 
The midline fascia was reapproximated with running bidirectional #1 PDS suture.
Subcutaneous tissue irrigated and skin closed with running subcuticular 3-0 Vicryl.
Steri-Strips were applied as was a silver sponge dressing. 
 
The patient was ultimately extubated after undergoing a bilateral TAP block for
postoperative analgesia.  Sponge, needle, and instrument counts were reported as correct
x3. Blood loss was estimated at 100 mL. 
 
 
 
            ________________________________________
            MD SHADE Desai/TRACYL
Job #:  [id number]
DD:  01/29/2021 15:03:24
DT:  01/29/2021 20:08:31
 
cc:            MD Ward Bowman DO
 
 
Copies:  NINA CHUNG MD, JAMES D DO
~
 
 
 
 
 
 
 
 
 
 
 
 
 
 
 
 
    Electronically Signed By: ALISON JEFFRIES MD  01/30/21 1611
_________________________________________________________________________________________
PATIENT NAME:     ANALIA AMIN                   
MEDICAL RECORD #: [id number]            OPERATIVE REPORT              
          ACCT #: [id number]  
DATE OF BIRTH:   [birth year]            REPORT #: [id number]      
PHYSICIAN:        ALISON JEFFRIES MD                 
PCP:              NINA CHUNG MD           
REPORT IS CONFIDENTIAL AND NOT TO BE RELEASED WITHOUT AUTHORIZATION

## 2021-01-30 NOTE — NUR
PT LYING IN BED. VS STABLE. PT WALKED THE PINO. TOLERATED WELL. STANDBY ASSIST
WITH . PT STATED PAIN 5/10. AGREED TO TAKE TYLENOL. PCA PUMP INTACT.
DENIES SOB. NO NUMBNESS OR TINGLING. MIDLINE DRESSING INTACT WITH A SMALL
SHADOWING. BOWEL TONES HYPOACTIVE ON ALL 4 QUADRANTS. SOME SWELLING ON
BILATERAL ARMS.  IN ROOM. ASSESSMENT COMPLETE. I AND O'S DONE. NO
FURTHER NEEDS AT THIS TIME. CALL LIGHT IN REACH. prior attempts unsuccessful/urinry obstruction or retention/bladder distention

## 2021-01-30 NOTE — NUR
Rounded on patient. Resting in bed with eyes closed. Breathing is even and
unlabored, PCA pump working WNL. IVF infusing WNL. CPOX in place, 95% on RA.
Call light in reach.

## 2021-01-30 NOTE — NUR
VITALS AND I/OS COMPLETE. PATIENT HAS BEEN USING PCA PUMP, STATES PAIN IS
COMPLETELY TOLERABLE NOW. IVF INFUSING WNL. VSS. MIDLINE INCISION COVERED WITH
ACTECOTE, C/D/I. CALL LIGHT IN REACH,  AT BEDSIDE

## 2021-01-31 NOTE — OR
[facility]
                                    [address], [place]  [address]
_________________________________________________________________________________________
                                                                 Signed   
 
 
DATE OF OPERATION:
01/26/2021
 
SURGEON:
Alison Jeffries MD
 
PREOPERATIVE DIAGNOSES:
1. Symptomatic ascites.
2. Large  pelvic mass.
3. Elevated CA-19 tumor marker (74.9).
 
POSTOPERATIVE DIAGNOSES:
1. Symptomatic ascites.
2. large  pelvic mass.
3. Elevated CA-19 tumor marker (74.9).
 
PROCEDURE:
Right abdominal paracentesis ultrasound-guided (3 L).
 
ANESTHESIA:
Lidocaine 1%.
 
INDICATIONS:
This 55-year-old white woman is referred by Dr. Nina Chung from [place] with
abdominal distention, abdominal pain, and a CT scan finding of significant ascites and a
10 cm right adnexal mass.  She was admitted last night, given intravenous fluids, has
been on pain control and so forth, and a CA-125 was obtained, this is almost 75, highly
concordant to ovarian cancer.  For symptom control and for cytologic diagnosis,
anticipating various treatment options.  I have recommended paracentesis.  The risks of
bleeding, infection, bowel injury, and so forth were reviewed with her, she understands
and wished to proceed. 
 
FINDINGS:
Ultrasonographic assistance was undertaken.  Direct interrogation of the peritoneal
cavity with a needle was documented.  Withdrawal of nearly 3 L of turbid, non-bloody,
yellow fluid was undertaken with marked good symptomatic relief of her tense ascites. 
 
DESCRIPTION OF PROCEDURE:
In her room with the patient in a semi-recumbent position after informed consent
signatures and so forth, the right abdominal wall was interrogated with the SonNeul
ultrasound device.  A clear area anticipated for paracentesis was well identified.  The
abdominal wall was prepared with chlorhexidine solution with sterile technique using
 
    Electronically Signed By: ALISON JEFFRIES MD  01/31/21 1532
_________________________________________________________________________________________
PATIENT NAME:     ANALIA AMIN                   
MEDICAL RECORD #: [id number]            OPERATIVE REPORT              
          ACCT #: [id number]  
DATE OF BIRTH:   [birth year]            REPORT #: [id number]      
PHYSICIAN:        ALISON JEFFRIES MD                 
PCP:              NINA CHUNG MD           
REPORT IS CONFIDENTIAL AND NOT TO BE RELEASED WITHOUT AUTHORIZATION
 
 
                                  [facility]
                                    [address], [place]  [address]
_________________________________________________________________________________________
                                                                 Signed   
 
 
glove, gown, and so forth.  Lidocaine 1% was injected over the right mid abdominal wall
essentially in the anterior axillary line.  A bit lateral to it was the entry point to
the abdomen.  Lidocaine 1% was injected locally and under ultrasonographic guidance with
a sterile sleeve on the probe, the anesthetic was injected.  Using a paracentesis
catheter kit, the needle was directed into the peritoneal cavity.  Aspiration showed
somewhat turbid yellow fluid.  A three-way stopcock system was used with the drainage
catheter and back container bottles and 3 L of fluid was withdrawn without problem.  She
had marked improvement of her symptoms and softening of her abdominal wall, there is
certainly still ascites fluid noted.  Withdrawal of the catheter allowed for placement
of a small Band-Aid.  She tolerated procedure well.  Cytologic evaluation be undertaken
as soon as possible.  I have reviewed this with Dr. Gross, the pathologist. 
 
 
 
            ________________________________________
            MD SHADE Desai/TRACYL
Job #:  [id number]
DD:  01/26/2021 15:34:15
DT:  01/26/2021 16:08:12
 
cc:            Nina Chung MD
 
 
Copies:  NINA CHUNG MD
~
 
 
 
 
 
 
 
 
 
 
 
 
 
 
 
    Electronically Signed By: ALISON JEFFRIES MD  01/31/21 1532
_________________________________________________________________________________________
PATIENT NAME:     ELOISAANALIA                   
MEDICAL RECORD #: [id number]            OPERATIVE REPORT              
          ACCT #: [id number]  
DATE OF BIRTH:   [birth year]            REPORT #: [id number]      
PHYSICIAN:        ALISON JEFFRIES MD                 
PCP:              NINA CHUNG MD           
REPORT IS CONFIDENTIAL AND NOT TO BE RELEASED WITHOUT AUTHORIZATION

## 2021-01-31 NOTE — NUR
BEDSIDE HANDOFF REPORT RECEIVED FROM NIGHT SHIFT RN. PT SLEEPING, LEFT
UNDISTURBED. MORPHINE PCA SETTINGS CHECKED, PT HAS RECEIVED 6MG SINCE PCA
SYRINGE REPLACED.

## 2021-01-31 NOTE — NUR
PT RESTING IN BED, SBA TO BATHROOM, VOIDED. PT DENIES NAUSEA, TOLERTED FILL
LIQUID DIET YESTERDAY, ADVANCED TO REGULAR DIET, BREAKFAST AT BEDSIDE. PT ON
ROOM AIR, LUNG SOUNDS CLEAR. PT DENIES NEED FOR PCA OR NARCOTIC PAIN
MEDICASTION AT THIS TIME, RATING PAIN 5/10 WITH MOVEMENT, GIVEN MOTRIN,
DISCUSSED PAIN MANAGEMENT FOR THE DAY. PT WITH MIDLINE INCISION, SMALL AMOUNT
OF DRAIANGE TO LOWER DRESSING. BOWEL TONES ACTIVE, DENIES PASSING FLATUS. CMS
INTACT, SCDS IN PLACE. DISCUSSED PLAN OF CARE, PT TO WALK IN PINO AFTER EATING
BREAKFAST. PT HOPING TO DISCAHRGE TODAY, DISCUSSED WITH PT. PT DENIES OTHER
NEEDS AT THIS TIME.

## 2021-01-31 NOTE — NUR
PT LYING IN BED. STANDBY ASSIST TO THE BATHROOM. PT STATES HAVING PAIN 3/10
BUT TOLERABLE. STATED "I DONT WANT TO USE THE PCA ANYMORE, FEELING MUCH
BETTER AND WANTS TO GO HOME." PRN TYLENOL GIVEN PER REQUEST. VS STABLE. I AND
O'S COMPLETE. NO CHANGES ON MIDLINE DRESSING ON ABDOMEN. NEW IV BAG LR
ADMINISTERED. REPOSITIONED PT IN BED. CALL LIGHT IN REACH.  IN ROOM.
CALL LIGHT IN REACH.

## 2021-01-31 NOTE — NUR
PT PAIN WELL CONTROLLED WITH MOTRIN AND PERCOCET, MORPHINE PCA DISCONTINUED.
PT TOLERATING DIET. WALKED IN PINO AND TOOK SHOWER. IV SALINE LOCKED. MIDLINE
INCISION DRESSING REMOVED, STERISTRIPS IN PLACE. PLAN FOR DISCHARGE TOMORROW.

## 2021-01-31 NOTE — NUR
STANDBY ASSIST TO THE BATHROOM AND HALLWAY. PAIN AT 5/10. PRN PAIN MEDS GIVEN
PER REQUEST. SLEPT THROUGHOUT THE NIGHT. REMAINS ON MORPHINE PCA. REPLACED
SYRINGE. TOLERATING GENERAL DIET. DRESSING OVER MIDLINE ABDOMINAL DRESSING
INTACT. NO CHANGES NOTED. SCD'S INTACT THROUGHOUT THE NIGHT.

## 2021-01-31 NOTE — NUR
PATIENT TOOK A SHOWER WITH HER HUSBANDS ASSISTANCE, PATIEINT SAID SHE DID NOT
WANT HER BEDDING CHANGED, AND DID NOT WANT A DRAWSHEET ON HER BED EITHER.

## 2021-01-31 NOTE — NUR
PT LYING IN BED. ASSISTED TO THE BATHROOM STANDBY WITH CANE AT BASELINE. PT
DENIES PAIN. REPOSITIONED PT IN BED. ASSESSMENT COMPLETE. CALL LIGHT IN REACH.
BED ALARM ON.

## 2021-01-31 NOTE — NUR
PT LYING IN BED. PCA MED REPLACED WITH THIS RN AND CHARGE NURSE. PT STATES
HAVING PAIN 6/10 WHEN GETTING OUT OF BED. EDUCATION GIVEN ON TAKING ORAL PAIN
MEDS. MOTRIN PRN GIVEN PER REQUEST. PT VERBALIZED UNDERSTANDING.
REPOSITIONED PT IN BED.  IN ROOM. BEDPAN IN ROOM PER REQUEST. CALL
LIGHT IN REACH.

## 2021-01-31 NOTE — NUR
DR. JEFFRIES TO BEDSIDE TO EVALUATE PT. DR. JEFFRIES REMOVED DRESSING, OK TO SHOWER.
DISCONTINUE PCA AND IV FLUIDS. PLAN FOR DISCHARGE TOMORROW. PT GIVEN MOTRIN
AND 1 TAB PERCOCET. CNA TO ASSIST PT WITH SHOWER SET UP. PT DENIES OTHER NEEDS
AT THIS TIME.

## 2021-01-31 NOTE — NUR
PT LYING IN BED. ASSESSMENT COMPLETE. I AND O'S DONE. SCHEDULED MEDS GIVEN.
PRN PAIN MED TYLENOL GIVEN PER REQUEST. PAIN 5/10 TOLERBABLE. PT DENIES SOB.
STERI STRIPS INTACT ON MIDLINE ABDOMEN AND OPEN TO AIR. BOWEL SOUNDS ACTIVE.
PT STATES THAT SHE PASSED GAS RECENTLY. VS STABLE. CHICKEN BROTH GIVEN. CALL
LIGHT IN REACH. NO FURTHER NEEDS.

## 2021-02-01 NOTE — NUR
PATIENT IN BED, PATIENT WAS ASKED IF SHE WANTED BREAKFAST, SHE STATED SHE
WASNT VERY HUNGRY AND THAT SHE WOULD JUST LIKE A FRUUIT PLATE AND CRANBERRY
JUICE. SHE HAS ALREADY SHOWERED AND USED THE RESTROOM.

## 2021-02-01 NOTE — NUR
ASSESSMENT COMPLETED. DR JEFFRIES IN FOR ROUNDS. POC AND DISCHARGE DISCUSSED. PT
UP FOR SECOND SHOWER. REPORTS SOME BELCHING, BUT PASSING LOTS OF FLATUS. NO
BM. MOM ADMISNTERED PER DR DALTON. LUNGS DIM IN BASES. PT REPORTS SMALL AMOUNT
OF BLOATING BUT NO NAUSEA. PAIN REPORTED AS 4/10. NO MEDICATION NEEDED.
MIDLINE WELL APPROXIMATED WITH STERI STRIPS IN PLACE. WNL.

## 2021-02-01 NOTE — NUR
STANDBY ASSIST TO THE BATHROOM. PT C/O PAIN 6/10. PRN PAIN MEDS GIVEN PER
REQUEST. REPOSITIONED PT IN BED. STERI STRIPS INTACT ON MIDLINE ABDOMEN. NO
FURTHER CHANGES AT THIS TIME. CALL LIGHT IN REACH. NO FURTHER NEEDS.

## 2021-02-01 NOTE — NUR
PT LYING IN BED. STATES THAT PAIN IS 3/10 AND TOLERABLE. ASSESSMENT COMPLETE.
REPOSITIONED PT IN BED. STERI STRIPS INTACT ON MIDLINE ABDOMEN. NO CHANGES AT
THIS TIME. CALL LIGHT IN REACH.

## 2021-02-01 NOTE — NUR
patient was in bed watching tv, patient didnt have to use the bathroom and was
still snacking on her breakfast.

## 2021-02-01 NOTE — NUR
SPOKE WITH PATIENT IN ROOM.  SHE HAS JUST FINISHED A SHOWER AND FEELS GREAT.
SHE STILL PLANS TO DISCHARGE HOME WITH .  DENIES CONCERNS ABOUT THIS,
FEELS SAFE AND READY TO GO.  SHE WILL LET NURSES KNOW IF SHE HAS ANY QUESTIONS
THROUGH DISCHARGE PROCESS.  HER  WILL PROVIDE TRANSPORTATION HOME AND
TO APPOINTMENTS.

## 2021-02-01 NOTE — NUR
DISCHARGE INSTRUCTIONS PROVIDED INCLUDING, S/SX OF ILEUS, WHEN TO CALL DR,
S/SX OF INFECTIONS, FOLLOW-UO APPOINTMENT, ACTIVITY RESTRICTIONS. PT WITH NO
QUESTIONS.

## 2021-02-01 NOTE — NUR
PT LAYING IN BED, JUST FINISHED SHOWER. IS TO DC LATER TODAY- TO
PICKUP FOLLOWING DC. GAVE BLESSING, WILL FOLLOW AS NEEDED

## 2021-03-09 ENCOUNTER — HOSPITAL ENCOUNTER (OUTPATIENT)
Dept: HOSPITAL 46 - OPS | Age: 56
Discharge: HOME | End: 2021-03-09
Attending: SURGERY
Payer: COMMERCIAL

## 2021-03-09 VITALS — WEIGHT: 165 LBS | BODY MASS INDEX: 25.9 KG/M2 | HEIGHT: 67 IN

## 2021-03-09 DIAGNOSIS — C56.2: Primary | ICD-10-CM

## 2021-03-09 DIAGNOSIS — Z87.891: ICD-10-CM

## 2021-03-09 DIAGNOSIS — K29.80: ICD-10-CM

## 2021-03-09 DIAGNOSIS — K31.4: ICD-10-CM

## 2021-03-09 DIAGNOSIS — K20.90: ICD-10-CM

## 2021-03-09 DIAGNOSIS — K44.9: ICD-10-CM

## 2021-03-09 DIAGNOSIS — K63.5: ICD-10-CM

## 2021-03-09 PROCEDURE — G0500 MOD SEDAT ENDO SERVICE >5YRS: HCPCS

## 2021-03-09 PROCEDURE — 0DBN8ZZ EXCISION OF SIGMOID COLON, VIA NATURAL OR ARTIFICIAL OPENING ENDOSCOPIC: ICD-10-PCS | Performed by: SURGERY

## 2021-03-09 PROCEDURE — 0DB98ZZ EXCISION OF DUODENUM, VIA NATURAL OR ARTIFICIAL OPENING ENDOSCOPIC: ICD-10-PCS | Performed by: SURGERY

## 2021-03-09 NOTE — OR
[facility]
                                    [address], [place]  [address]
_________________________________________________________________________________________
                                                                 Draft    
 
 
DATE OF OPERATION:
03/09/2021
 
SURGEON:
Alison Jeffries MD
 
PREOPERATIVE DIAGNOSIS:
History of excision of giant left ovarian mass with no evidence of metastatic disease
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
Krukenberg tumor. 
 
POSTOPERATIVE DIAGNOSES:
1. Normal upper endoscopy except for hiatal hernia and distal esophagitis.
2. Small polyp of colon.  No evidence of neoplasm of colon.
 
PROCEDURES:
1. Esophagogastroduodenoscopy with biopsy.
2. Total colonoscopy to cecum with cold morcellation polypectomy of small polyp at
rectosigmoid. 
 
ANESTHESIA:
Intravenous sedation, fentanyl 150 mcg, Versed 8 mg.
 
INDICATION:
This 56-year-old white woman is a patient Dr. Nina Chung and was referred to me with
symptomatic ascites and finding of a giant pelvic mass.  Her CA-125 was noted to be 74.9
and she was thought likely to have a primary ovarian malignancy.  She underwent
paracentesis. Cytology showed no evidence of malignant cells.  She subsequently
underwent excision of the left ovarian mass including treatment as if for ovarian cancer
including abdominal hysterectomy, retroperitoneal lymph node dissection, peritoneal
biopsy, omentectomy and right hemidiaphragm biopsy.  All of the biopsied areas showed no
evidence of malignant disease.  Appendectomy was also performed.  She had on final
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 
 
On the basis of these findings and notably with normal CEA and a CA-125, now down to the
mid 30s, she has been recommended to undergo upper endoscopy and colonoscopy to assess
for a primary lesion, which may have accounted for a possible Krukenberg tumor
(metastatic disease to the left ovary).  The risks of bleeding, infection, perforation,
and so forth were related to upper endoscopy and lower endoscopy were reviewed in
detail.  She understands and wished to proceed. 
 
FINDINGS:
 
                                                                                    
_________________________________________________________________________________________
PATIENT NAME:     ANALIA AMIN                   
MEDICAL RECORD #: [id number]            OPERATIVE REPORT              
          ACCT #: [id number]  
DATE OF BIRTH:   [birth year]            REPORT #: [id number]      
PHYSICIAN:        ALISON JEFFRIES MD                 
PCP:              NINA CHUNG MD           
REPORT IS CONFIDENTIAL AND NOT TO BE RELEASED WITHOUT AUTHORIZATION
 
 
                                  [facility]
                                    [address], [place]  [address]
_________________________________________________________________________________________
                                                                 Draft    
 
 
Upper endoscopy did show distal esophagitis and a hiatal hernia.  Stomach was reasonably
normal.  There was mild bulbar duodenitis.  There was no evidence of malignancy. 
 
Colonoscopy showed an excellent prep.  Complete colonoscopy was undertaken to the cecum
without question.  She had no evidence of neoplasm, though she did have a small polyp of
the rectosigmoid, which was excised. 
 
DESCRIPTION OF PROCEDURE:
The patient was brought to the endoscopy suite and given topical lidocaine
hypopharyngeal anesthesia and placed in lateral decubitus position.  She was given
intravenous sedation to the point of slurred speech and nystagmus.  A bite block was
placed.  Full cardiopulmonary monitoring was maintained.  An Olympus video upper
endoscope was passed in the hypopharynx.  The vocal cords appeared normal.  Scope was
advanced into the esophagus. Throughout its length, it was normal except in the distal
portion where there was inflammatory change and minimal nodular change.  The scope was
advanced to the stomach, which was insufflated with air.  Rugal folds were normal.
Antrum was normal.  Pylorus normal.  Scope was passed into the duodenum.  Second and 3rd
portions were normal.  The bulbar portion was mildly inflamed.  Biopsies were obtained.
The scope was withdrawn.  A biopsy was taken of the antrum for both FLORIAN and pathologic
testing. Retroflexed view was undertaken and upon withdrawal of scope, a hiatal hernia
was noted as well as a small fundic diverticulum.  The scope was straightened and
withdrawn to the distal esophagus where minimal nodular changes of the esophagus were
noted.  Biopsies were obtained.  There was no evidence of Ma's epithelium.
Multiple such biopsies were obtained.  The scope was withdrawn and the remaining
esophagus was normal. 
 
Plans were then made for colonoscopy.  The table was rotated and additional sedation
given.  Digital rectal examination was normal. 
 
The Olympus video colonoscope was passed in the rectum and manipulated throughout the
colon ultimately intubating the cecum itself.  The ileocecal valve and appendiceal
orifice were normal.  Scope was withdrawn from that point.  Examination throughout
showed no sign of polyps, specifically no sign of neoplasm until the rectosigmoid where
a very small adenomatous-appearing polyp was noted.  This was excised with cold
morcellation technique.  Further withdrawal of scope allowed for retroflexed view in the
rectum.  Rectum was normal.  Scope was removed.  The patient was taken to the recovery
room in good condition. 
 
CONCLUDING DIAGNOSIS:
No lesion to account for a Krukenberg tumor at this point.
 
PLAN:
 
                                                                                    
_________________________________________________________________________________________
PATIENT NAME:     ANALIA AMIN                   
MEDICAL RECORD #: [id number]            OPERATIVE REPORT              
          ACCT #: [id number]  
DATE OF BIRTH:   [birth year]            REPORT #: [id number]      
PHYSICIAN:        ALISON JEFFRIES MD                 
PCP:              NINA CHUNG MD           
REPORT IS CONFIDENTIAL AND NOT TO BE RELEASED WITHOUT AUTHORIZATION
 
 
                                  [address]  [address]
_________________________________________________________________________________________
                                                                 Draft    
 
 
We will see the patient back in about 3 months repeating the tumor markers at that time.
 We will review further with medical oncologist, Dr. Faith and others regarding
whether or not additional studies should be undertaken. 
 
 
 
            ________________________________________
            MD SHADE Desai/TRACYL
Job #:  [id number]
DD:  03/09/2021 14:51:03
DT:  03/09/2021 21:11:58
 
cc:            Nina Chung MD
 
 
Copies:  NINA CHUNG MD
~
 
 
 
 
 
 
 
 
 
 
 
 
 
 
 
 
 
 
 
 
 
 
 
                                                                                    
_________________________________________________________________________________________
PATIENT NAME:     ANALIA AMIN                   
MEDICAL RECORD #: [id number]            OPERATIVE REPORT              
          ACCT #: [id number]  
DATE OF BIRTH:   [birth year]            REPORT #: [id number]      
PHYSICIAN:        ALISON JEFFRIES MD                 
PCP:              NINA CHUNG MD           
REPORT IS CONFIDENTIAL AND NOT TO BE RELEASED WITHOUT AUTHORIZATION

## 2021-03-09 NOTE — NUR
03/09/21 1451 Mona Lockhart
3970-PATIENT ARRIVED TO PACU ON 2L NC RR EVEN. PATIENT HAS EYES CLOSED
BUT AROUSES AND ANSWERS QUESTIONS
 TO VERBAL STIMULI DENIES PAIN OR NAUSEA. ABDOMEN SOFT.
ENCOURAGED TO PASS GAS. IVF INFUSING.

## 2021-03-10 NOTE — PATH
[facility]
                                    [address], [place]  [address]
_________________________________________________________________________________________
                                                                 Signed   
 
 
 
SPECIMEN(S): A DUODENUM
SPECIMEN(S): B DUODENAL BULB
SPECIMEN(S): C ANTRUM/PYLORUS
SPECIMEN(S): D GE JUNCTION
SPECIMEN(S): E RECTOSIGMOID POLYP
 
SPECIMEN SOURCE:
A. DUODENUM
B. DUODENAL BULB
C. ANTRUM/PYLORUS
D. GE JUNCTION
E. RECTOSIGMOID POLYP
 
CLINICAL HISTORY:
Esophagogastroduodenoscopy, colonoscopy.  Malignant tumor of ovary.  Dx: Hiatal 
hernia distal esophagus, rectosigmoid polyp. 
MICROSCOPIC DESCRIPTION:
Histologic sections of all submitted blocks are examined by light microscopy. 
These findings, together with the gross examination, support the pathologic 
diagnosis. 
 
FINAL PATHOLOGIC DIAGNOSIS:
A.  Duodenum, biopsy:
-  Duodenal mucosa with no histopathologic abnormality.
-  Negative for increased intraepithelial lymphocytes.
-  Negative for dysplasia or malignancy.
B.  Duodenum, bulb, biopsy:
-  Duodenal bulb type mucosa with changes consistent with peptic duodenitis.
-  Negative for increased intraepithelial lymphocytes.
-  Negative for dysplasia or malignancy.
C.  Stomach, antrum/pylorus, biopsy:
-  Oxyntic mucosa with no histopathologic abnormality.
-  Negative for Helicobacter organisms on HE stain.
-  Negative for dysplasia or malignancy.
D.  Gastroesophageal junction, biopsy:
-  Squamocolumnar junctional mucosa with chronic inflammation and reactive 
epithelial changes, consistent with reflux esophagitis. 
-  Fragments of fibrinopurulent exudate.
-  Negative for viral cytopathic changes on HE stain.
-  Negative for intestinal metaplasia, dysplasia, or malignancy.
E.  Colon, rectosigmoid, polyp, polypectomy:
 
                                                                                    
_________________________________________________________________________________________
PATIENT NAME:     ANALIA AMIN                   
MEDICAL RECORD #: [id number]            PATHOLOGY                     
          ACCT #: [id number]       ACCESSION #: [id number]     
DATE OF BIRTH:   [birth year]            REPORT #: [id number]       
PHYSICIAN:        JACIEL PATHOLOGY              
PCP:              NINA CORBETT MD           
REPORT IS CONFIDENTIAL AND NOT TO BE RELEASED WITHOUT AUTHORIZATION
 
 
                                  [facility]
                                    [address], [place]  [address]
_________________________________________________________________________________________
                                                                 Signed   
 
 
 
-  Fragments of hyperplastic polyp.
-  Negative for dysplasia or malignancy.
NAL:cml:C2NR
 
GROSS DESCRIPTION:
Five specimens are received in five containers labeled with "EP."
A. The specimen, labeled "EP," and designated on the requisition "duodenum 
biopsy," is received in formalin and consists of one fragment of pink-tan 
tissue (0.3 x 0.2 x 0.2 cm).  The specimen is 
submitted entirely in cassette (A1).
B. The specimen, labeled "EP," and designated on the requisition "duodenum bulb 
biopsy," is received in formalin and consists of one fragment of pink-tan 
tissue (0.4 x 0.2 x 0.2 cm).  The specimen is 
submitted entirely in cassette (B1).
C. The specimen, labeled "EP," and designated on the requisition 
"antrum/pylorus biopsy," is received in formalin and consists of two fragments 
of pink-tan tissue (0.5 x 0.2 x 0.2 cm in aggregate). 
The specimen is submitted entirely in cassette (C1).
 
D. The specimen, labeled "EP," and designated on the requisition "GE junction 
biopsy," is received in formalin and consists of multiple fragments of 
white-tan tissue (1.0 x 0.4 x 0.2 cm in aggregate). 
The specimen is submitted entirely in cassette (D1).
E. The specimen, labeled "EP," and designated on the requisition "rectosigmoid 
polypectomy," is received in formalin and consists of three fragments of 
pink-tan tissue (0.6 x 0.2 x 0.2 cm in 
aggregate).  The specimen is submitted entirely in cassette (E1).
AC (under the direct supervision of a pathologist
The Gross Description was prepared using a voice recognition system. The report 
was reviewed for accuracy; however, sound-alike word errors, addition and/or 
deletions may occur. If there is any 
question about this report, please contact Client Services.
 
PERFORMING LABORATORY:
The technical component was performed by [employer] [address] (Medical Director: Tavia Soto MD; CLIA# [id number]). 
Professional interpretation was performed by [facility], [address] [place] [address] (CLIA# 
[id number]). 
 
Diagnostician:  Argentina Gross MD
 
                                                                                    
_________________________________________________________________________________________
PATIENT NAME:     ANALIA AMIN                   
MEDICAL RECORD #: [id number]            PATHOLOGY                     
          ACCT #: [id number]       ACCESSION #: [id number]     
DATE OF BIRTH:   [birth year]            REPORT #: [id number]       
PHYSICIAN:        JACIEL LOBO              
PCP:              NINA CORBETT MD           
REPORT IS CONFIDENTIAL AND NOT TO BE RELEASED WITHOUT AUTHORIZATION
 
 
                                  [facility]
                                    [address], [place]  [address]
_________________________________________________________________________________________
                                                                 Signed   
 
 
Pathologist
Electronically Signed 03/10/2021
 
 
Copies:                                
~
 
 
 
 
 
 
 
 
 
 
 
 
 
 
 
 
 
 
 
 
 
 
 
 
 
 
 
 
 
 
 
 
 
 
 
 
 
                                                                                    
_________________________________________________________________________________________
PATIENT NAME:     ANALIA AMIN                   
MEDICAL RECORD #: [id number]            PATHOLOGY                     
          ACCT #: [id number]       ACCESSION #: [id number]     
DATE OF BIRTH:   [birth year]            REPORT #: [id number]       
PHYSICIAN:        JACIEL LOOB              
PCP:              NINA CORBETT MD           
REPORT IS CONFIDENTIAL AND NOT TO BE RELEASED WITHOUT AUTHORIZATION

## 2024-08-08 NOTE — NUR
Chronic, patient current Hb 11.7  -continue to monitor    REPORT RECEIVED FROM JOS GARCIA. PT RESTING IN BED, STATES PAIN IS "RAMPING UP"
AT THIS TIME. PRN MEDS HAD BEEN GIVEN AT 1800. NURSING STUDENT IN ROOM TO TAKE
VITALS.  AT BEDSIDE. CALL LIGHT IN REACH, WILL CONTINUE TO MONITOR